# Patient Record
Sex: FEMALE | Race: WHITE | Employment: FULL TIME | URBAN - NONMETROPOLITAN AREA
[De-identification: names, ages, dates, MRNs, and addresses within clinical notes are randomized per-mention and may not be internally consistent; named-entity substitution may affect disease eponyms.]

---

## 2024-03-28 ENCOUNTER — OFFICE VISIT (OUTPATIENT)
Dept: MEDICAL GROUP | Facility: PHYSICIAN GROUP | Age: 24
End: 2024-03-28

## 2024-03-28 VITALS
BODY MASS INDEX: 25.1 KG/M2 | OXYGEN SATURATION: 96 % | SYSTOLIC BLOOD PRESSURE: 118 MMHG | RESPIRATION RATE: 12 BRPM | HEART RATE: 98 BPM | WEIGHT: 136.4 LBS | TEMPERATURE: 96.8 F | HEIGHT: 62 IN | DIASTOLIC BLOOD PRESSURE: 62 MMHG

## 2024-03-28 DIAGNOSIS — D13.91 FAMILIAL POLYPOSIS OF COLON: ICD-10-CM

## 2024-03-28 DIAGNOSIS — Z13.79 GENETIC TESTING: ICD-10-CM

## 2024-03-28 DIAGNOSIS — F31.81 BIPOLAR 2 DISORDER (HCC): ICD-10-CM

## 2024-03-28 DIAGNOSIS — Z12.4 ENCOUNTER FOR PAPANICOLAOU SMEAR OF CERVIX: ICD-10-CM

## 2024-03-28 DIAGNOSIS — Z80.0 FAMILY HISTORY OF COLON CANCER: ICD-10-CM

## 2024-03-28 DIAGNOSIS — G43.109 MIGRAINE WITH AURA AND WITHOUT STATUS MIGRAINOSUS, NOT INTRACTABLE: ICD-10-CM

## 2024-03-28 DIAGNOSIS — G47.30 SLEEP APNEA, UNSPECIFIED TYPE: ICD-10-CM

## 2024-03-28 PROCEDURE — 3078F DIAST BP <80 MM HG: CPT | Performed by: NURSE PRACTITIONER

## 2024-03-28 PROCEDURE — 3074F SYST BP LT 130 MM HG: CPT | Performed by: NURSE PRACTITIONER

## 2024-03-28 PROCEDURE — 99204 OFFICE O/P NEW MOD 45 MIN: CPT | Performed by: NURSE PRACTITIONER

## 2024-03-28 RX ORDER — FLUOXETINE 10 MG/1
10 CAPSULE ORAL DAILY
COMMUNITY

## 2024-03-28 RX ORDER — HYDROXYZINE HYDROCHLORIDE 10 MG/1
10 TABLET, FILM COATED ORAL
COMMUNITY

## 2024-03-28 RX ORDER — OXCARBAZEPINE 300 MG/1
300 TABLET, FILM COATED ORAL 2 TIMES DAILY
COMMUNITY

## 2024-03-28 ASSESSMENT — ENCOUNTER SYMPTOMS
EYES NEGATIVE: 1
GASTROINTESTINAL NEGATIVE: 1
MUSCULOSKELETAL NEGATIVE: 1
WEIGHT LOSS: 0
SHORTNESS OF BREATH: 0
CHILLS: 0
DEPRESSION: 0
INSOMNIA: 0
FEVER: 0
DIZZINESS: 0
NERVOUS/ANXIOUS: 0
HEADACHES: 1

## 2024-03-28 ASSESSMENT — PATIENT HEALTH QUESTIONNAIRE - PHQ9: CLINICAL INTERPRETATION OF PHQ2 SCORE: 0

## 2024-03-28 NOTE — LETTER
[facility]  ANNE Dowling  [address] [facility] [address]  [facility] [address]  Fax: [phone]   Authorization for Release/Disclosure of   Protected Health Information   Name: MAYLIN HANEY : 2000 SSN: [id number]   Address: [address] [place] [id number] Phone:    There are no phone numbers on file.   I authorize the entity listed below to release/disclose the PHI below to:   [facility]/ANNE Dowling and ANNE Dowling   Provider or Entity Name:  [facility]   Address   City, State, Zip  Golf Phone:      Fax:     Reason for request: continuity of care   Information to be released:    [  ] LAST COLONOSCOPY,  including any PATH REPORT and follow-up  [  ] LAST FIT/COLOGUARD RESULT [  ] LAST DEXA  [  ] LAST MAMMOGRAM  [  ] LAST PAP  [  ] LAST LABS [  ] RETINA EXAM REPORT  [  ] IMMUNIZATION RECORDS  [  ] Release all info      [  ] Check here and initial the line next to each item to release ALL health information INCLUDING  _____ Care and treatment for drug and / or alcohol abuse  _____ HIV testing, infection status, or AIDS  _____ Genetic Testing    DATES OF SERVICE OR TIME PERIOD TO BE DISCLOSED: _____________  I understand and acknowledge that:  * This Authorization may be revoked at any time by you in writing, except if your health information has already been used or disclosed.  * Your health information that will be used or disclosed as a result of you signing this authorization could be re-disclosed by the recipient. If this occurs, your re-disclosed health information may no longer be protected by State or Federal laws.  * You may refuse to sign this Authorization. Your refusal will not affect your ability to obtain treatment.  * This Authorization becomes effective upon signing and will  on (date) __________.      If no date is indicated, this Authorization will  one (1) year from the signature date.    Name: Maylin Haney  Signature:  Date:   3/28/2024     PLEASE FAX REQUESTED RECORDS BACK TO: [phone]

## 2024-03-28 NOTE — PROGRESS NOTES
Verbal consent was acquired by the patient to use [employer] ambient listening note generation during this visit     CC:  Chief Complaint   Patient presents with    Establish Care     Pt has colon polpys    Referral Needed     OB GYN       Sonia Dumont 23 y.o. female  patient presenting for     History of Present Illness  The patient is a 23-year-old female coming in today with her  to establish care and then also requesting some referrals. She is accompanied by her .    Bipolar 2  She has type 2 bipolar disorder, which was diagnosed 6 years ago. She takes oxcarbazepine 300 mg 2 tablets in the morning and 3 tablets at bedtime, which is prescribed by her psychiatrist, Dr. Lele Ruiz. She takes hydroxyzine 5 mg as needed, She takes fluoxetine 10 mg daily.  Otherwise, she does not take it unless she can not sleep. She is overdue for her blood work. Her psychiatrist also needs her blood work done.    Familial polyposis  She has familial polyposis. She was diagnosed with it in 12/2021 with colonoscopy- pending records. She denies any changes in her bowel movements. She has changed her diet for the most part, and her bowel movements have gotten significantly better. As long as she is staying hydrated, her bowel movements are totally normal. She was supposed to do genetic testing through her old insurance, but they never let her set the appointment up because they did not get the referral. She has a history of colon polyps.    migraines  She gets headaches. It is a mix of both headaches and migraines. She gets vision changes with them. It is usually tunnel vision. Sound sometimes makes it worse, but they are not always quite the same. Sometimes, it feels different. They do change the area of the head they are going to. She takes Excedrin, which works well for her. She typically has 6 to 10 headaches a month. She denies any nausea or vomiting with Excedrin.    Sleep apnea  She was diagnosed with  "sleep apnea last year. She saw a sleep clinic in [place]. They wanted her to get started on a CPAP, but due to cost, she was unable to. She is not sure if she needs to redo any testing or if her new insurance will just cover it. They started her on CPAP because of excessive daytime sleepiness. They were not sure if it was narcolepsy or sleep apnea. They trialed her on different medications, which did not work.    seborrheic dermatitis  She has seborrheic dermatitis. She washes her face with oil.     Family history of colon cancer  She has a family history of colon cancer. Her mother  at 36.  She had her colon and rectum removed.       Review of Systems   Constitutional:  Negative for chills, fever, malaise/fatigue and weight loss.   HENT: Negative.     Eyes: Negative.    Respiratory:  Negative for shortness of breath.    Cardiovascular:  Negative for chest pain.   Gastrointestinal: Negative.    Genitourinary: Negative.    Musculoskeletal: Negative.    Skin: Negative.    Neurological:  Positive for headaches. Negative for dizziness.   Psychiatric/Behavioral:  Negative for depression and suicidal ideas. The patient is not nervous/anxious and does not have insomnia.        /62 (BP Location: Left arm, Patient Position: Sitting, BP Cuff Size: Adult)   Pulse 98   Temp 36 °C (96.8 °F) (Temporal)   Resp 12   Ht 1.575 m (5' 2\")   Wt 61.9 kg (136 lb 6.4 oz)   SpO2 96% , Body mass index is 24.95 kg/m².    Physical Exam  Constitutional:       General: She is not in acute distress.     Appearance: Normal appearance. She is normal weight. She is not ill-appearing.   HENT:      Head: Normocephalic and atraumatic.      Right Ear: Tympanic membrane, ear canal and external ear normal.      Left Ear: Tympanic membrane, ear canal and external ear normal.      Nose: Nose normal.      Mouth/Throat:      Mouth: Mucous membranes are moist.      Pharynx: Oropharynx is clear.   Eyes:      Extraocular Movements: Extraocular " movements intact.      Conjunctiva/sclera: Conjunctivae normal.      Pupils: Pupils are equal, round, and reactive to light.   Cardiovascular:      Rate and Rhythm: Normal rate and regular rhythm.      Pulses: Normal pulses.      Heart sounds: Normal heart sounds.   Pulmonary:      Effort: Pulmonary effort is normal.      Breath sounds: Normal breath sounds.   Musculoskeletal:         General: Normal range of motion.      Cervical back: Normal range of motion and neck supple. No tenderness.   Lymphadenopathy:      Cervical: No cervical adenopathy.   Skin:     General: Skin is warm and dry.      Capillary Refill: Capillary refill takes less than 2 seconds.   Neurological:      General: No focal deficit present.      Mental Status: She is alert and oriented to person, place, and time.   Psychiatric:         Mood and Affect: Mood normal.         Behavior: Behavior normal.         Thought Content: Thought content normal.         Judgment: Judgment normal.           Results        Assessment & Plan  1. Family history of colorectal cancer.  It is a chronic condition.   I will go ahead and get her a new genetic referral study.    2. Family history of colon cancer.  With the family history of colon cancer,   we will do a GI colonoscopy.    3. Personal history of polyps.  This is a chronic and stable condition.   She needs a new GI colonoscopy referral.    4. Health screening.  I will get a new referral to OB/GYN. I will get a CBC, thyroid testing, lipid panel, and a CMP.    5. Bipolar 2 disorder.  It is chronic and stable condition.   She will continue to follow with psychiatry, Dr. Ruiz. She will continue with Prozac 10 mg daily, hydroxyzine 5 mg as needed, and the Trileptal as prescribed.    6. Migraines.  This is a chronically stable condition.   She will continue Excedrin.    7. Sleep apnea.  This is chronic and stable condition.   The patient will follow back up with pulmonology as she has new insurance to find out  if she needs to do a new sleep study or if she can just use the previous sleep study from last year.       1. Bipolar 2 disorder (HCC)  CBC WITHOUT DIFFERENTIAL    FREE THYROXINE    TSH    Lipid Profile    Comp Metabolic Panel      2. Familial polyposis of colon  Referral to GI for Colonoscopy    Referral to Genetic Research Studies      3. Family history of colon cancer  Referral to GI for Colonoscopy    Referral to Genetic Research Studies      4. Genetic testing  Referral to Genetic Research Studies      5. Encounter for Papanicolaou smear of cervix  Referral to OB/Gyn      6. Migraine with aura and without status migrainosus, not intractable        7. Sleep apnea, unspecified type            Discussed with patient possible alternative diagnoses, patient is to take all medications as prescribed.     If symptoms persist FU w/PCP, if symptoms worsen go to emergency room.     If experiencing any side effects from prescribed medications reports to the office immediately or go to emergency room.    Reviewed indication, dosage, usage and potential adverse effects of prescribed medications.     Reviewed risks and benefits of treatment plan. Patient verbalizes understanding of all instruction and verbally agrees to plan.    Return for pending labs.    This note was created using voice recognition software ([employer]). The accuracy of the dictation is limited by the abilities of the software. I have reviewed the note prior to signing, however some errors in grammar and context are still possible. If you have any questions related to this note please do not hesitate to contact our office.

## 2024-03-29 ENCOUNTER — RESEARCH ENCOUNTER (OUTPATIENT)
Dept: RESEARCH | Facility: MEDICAL CENTER | Age: 24
End: 2024-03-29

## 2024-03-29 DIAGNOSIS — Z00.6 RESEARCH STUDY PATIENT: ICD-10-CM

## 2024-03-29 NOTE — RESEARCH NOTE
Patient has been referred by MELINA Dowling. Sent initial referral follow-up message with instructions to locate and sign consent form(s). The following consent form(s) have been pushed to the patient's MyChart: MIKE

## 2024-05-16 NOTE — RESEARCH NOTE
Confirmed with the participant which designated provider (ANNE Dowling) they would like study results shared with. Patient will have an opportunity to share the results with any providers of their choosing in the future by accessing their results from [employer].

## 2024-06-25 ENCOUNTER — OFFICE VISIT (OUTPATIENT)
Dept: URGENT CARE | Facility: CLINIC | Age: 24
End: 2024-06-25
Payer: COMMERCIAL

## 2024-06-25 VITALS
BODY MASS INDEX: 24.84 KG/M2 | OXYGEN SATURATION: 96 % | SYSTOLIC BLOOD PRESSURE: 98 MMHG | TEMPERATURE: 97.7 F | RESPIRATION RATE: 16 BRPM | WEIGHT: 135 LBS | DIASTOLIC BLOOD PRESSURE: 60 MMHG | HEART RATE: 96 BPM | HEIGHT: 62 IN

## 2024-06-25 DIAGNOSIS — M79.2 NERVE PAIN: ICD-10-CM

## 2024-06-25 PROCEDURE — 3078F DIAST BP <80 MM HG: CPT | Performed by: NURSE PRACTITIONER

## 2024-06-25 PROCEDURE — 3074F SYST BP LT 130 MM HG: CPT | Performed by: NURSE PRACTITIONER

## 2024-06-25 PROCEDURE — 99214 OFFICE O/P EST MOD 30 MIN: CPT | Performed by: NURSE PRACTITIONER

## 2024-06-25 RX ORDER — PREDNISONE 5 MG/1
5 TABLET ORAL DAILY
Qty: 30 TABLET | Refills: 0 | Status: SHIPPED | OUTPATIENT
Start: 2024-06-25

## 2024-06-25 NOTE — PROGRESS NOTES
"Subjective:   Sonia Dumont is a 23 y.o. female who presents for Other (X 4 days of both arms and leg numbness. )       HPI  Patient is a pleasant 23 y.o. who presents for evaluation of 4-day history of intermittent bilateral upper extremity numbness, pains, and tingling.  Patient also endorses intermittent lower extremity pains and chills.  She reports a history of cervical disc bulge which will intermittently cause left hand numbness and tingling.  Denies prior history of right hand involvement.  Denies prior history of nerve issues.  Denies any trauma or injury to upper extremities or neck.  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    ROS  All other systems are negative except as documented above within [facility].    MEDS:   Current Outpatient Medications:     levonorgestrel (MIRENA) 20 MCG/DAY IUD, 1 Each by Intrauterine route., Disp: , Rfl:     OXcarbazepine (TRILEPTAL) 300 MG Tab, Take 300 mg by mouth 2 times a day. 2 tablets in am and 3 at pm, Disp: , Rfl:     FLUoxetine (PROZAC) 10 MG Cap, Take 10 mg by mouth every day. Indications: Depressive Phase of Manic-Depression, Disp: , Rfl:     hydrOXYzine HCl (ATARAX) 10 MG Tab, Take 10 mg by mouth one time as needed for Anxiety. Indications: Feeling Anxious, Disp: , Rfl:   ALLERGIES:   Allergies   Allergen Reactions    Amoxicillin-Pot Clavulanate      rash       Patient's PMH, SocHx, SurgHx, FamHx, Drug allergies and medications were reviewed.     Objective:   BP 98/60   Pulse 96   Temp 36.5 °C (97.7 °F) (Temporal)   Resp 16   Ht 1.575 m (5' 2\")   Wt 61.2 kg (135 lb)   SpO2 96%   BMI 24.69 kg/m²     Physical Exam  Vitals and nursing note reviewed.   Constitutional:       General: She is awake.      Appearance: Normal appearance. She is well-developed.   HENT:      Head: Normocephalic and atraumatic.      Right Ear: External ear normal.     "  Left Ear: External ear normal.      Nose: Nose normal.      Mouth/Throat:      Mouth: Mucous membranes are moist.      Pharynx: Oropharynx is clear.   Eyes:      Extraocular Movements: Extraocular movements intact.      Conjunctiva/sclera: Conjunctivae normal.   Cardiovascular:      Rate and Rhythm: Normal rate and regular rhythm.   Pulmonary:      Effort: Pulmonary effort is normal.      Breath sounds: Normal breath sounds.   Musculoskeletal:         General: Normal range of motion.      Cervical back: Normal range of motion and neck supple.      Comments: No visible or palpable signs of deformity or injury, normal pronation and supination, negative Phalen's and Tinel's   Skin:     General: Skin is warm and dry.   Neurological:      Mental Status: She is alert and oriented to person, place, and time.   Psychiatric:         Mood and Affect: Mood normal.         Behavior: Behavior normal.         Thought Content: Thought content normal.         Assessment/Plan:   Assessment    1. Nerve pain  - predniSONE (DELTASONE) 5 MG Tab; Take 1 Tablet by mouth every day.  Dispense: 30 Tablet; Refill: 0      Vital signs stable at today's acute urgent care visit.  Begin medications as listed. Recommend change of positions, gentle stretching. Prefers to follow up with PCP, may benefit from PT or physiatry referral.    Advised the patient to follow-up with the primary care provider if symptoms persist.  Red flags discussed and indications to immediately call 911 or present to the ED. All questions were encouraged and answered to the patient's satisfaction and understanding, and they agree to the plan of care.     This is an acute problem with uncertain prognosis, medication management and instructions as well as management options were provided.  I personally reviewed prior external notes and test results pertinent to today and independently reviewed and interpreted all diagnostics, to include POCT testing. Time spent evaluating  this patient includes preparing for visit, counseling/education, exam, evaluation, obtaining history, and ordering lab/test/procedures.      Please note that this dictation was created using voice recognition software. I have made a reasonable attempt to correct obvious errors, but I expect that there are errors of grammar and possibly content that I did not discover before finalizing the note.

## 2024-07-18 ENCOUNTER — HOSPITAL ENCOUNTER (OUTPATIENT)
Dept: LAB | Facility: MEDICAL CENTER | Age: 24
End: 2024-07-18
Attending: NURSE PRACTITIONER

## 2024-07-18 DIAGNOSIS — Z00.6 RESEARCH STUDY PATIENT: ICD-10-CM

## 2024-08-01 LAB
APOB+LDLR+PCSK9 GENE MUT ANL BLD/T: NOT DETECTED
BRCA1+BRCA2 DEL+DUP + FULL MUT ANL BLD/T: NOT DETECTED
MLH1+MSH2+MSH6+PMS2 GN DEL+DUP+FUL M: NOT DETECTED